# Patient Record
Sex: MALE
[De-identification: names, ages, dates, MRNs, and addresses within clinical notes are randomized per-mention and may not be internally consistent; named-entity substitution may affect disease eponyms.]

---

## 2017-10-05 ENCOUNTER — HOSPITAL ENCOUNTER (INPATIENT)
Age: 42
LOS: 3 days | Discharge: HOME | DRG: 638 | End: 2017-10-08

## 2017-10-05 DIAGNOSIS — E86.0: ICD-10-CM

## 2017-10-05 DIAGNOSIS — N17.9: ICD-10-CM

## 2017-10-05 DIAGNOSIS — E78.2: ICD-10-CM

## 2017-10-05 DIAGNOSIS — E11.10: Primary | ICD-10-CM

## 2019-02-18 ENCOUNTER — HOSPITAL ENCOUNTER (INPATIENT)
Dept: HOSPITAL 91 - ICU | Age: 44
LOS: 2 days | Discharge: HOME | DRG: 639 | End: 2019-02-20
Payer: MEDICAID

## 2019-02-18 ENCOUNTER — HOSPITAL ENCOUNTER (INPATIENT)
Dept: HOSPITAL 10 - E/R | Age: 44
LOS: 2 days | Discharge: HOME | DRG: 639 | End: 2019-02-20
Attending: INTERNAL MEDICINE | Admitting: INTERNAL MEDICINE
Payer: MEDICAID

## 2019-02-18 VITALS
HEIGHT: 65 IN | WEIGHT: 161.38 LBS | BODY MASS INDEX: 26.89 KG/M2 | HEIGHT: 65 IN | WEIGHT: 161.38 LBS | BODY MASS INDEX: 26.89 KG/M2

## 2019-02-18 DIAGNOSIS — K29.70: ICD-10-CM

## 2019-02-18 DIAGNOSIS — E86.0: ICD-10-CM

## 2019-02-18 DIAGNOSIS — Z91.14: ICD-10-CM

## 2019-02-18 DIAGNOSIS — E11.10: Primary | ICD-10-CM

## 2019-02-18 DIAGNOSIS — Z79.82: ICD-10-CM

## 2019-02-18 DIAGNOSIS — Z79.4: ICD-10-CM

## 2019-02-18 LAB
ADD MAN DIFF?: NO
ANION GAP: 18 (ref 5–13)
ANION GAP: 20 (ref 5–13)
ANION GAP: 28 (ref 5–13)
BASOPHIL #: 0 10^3/UL (ref 0–0.1)
BASOPHILS %: 0.4 % (ref 0–2)
BLOOD UREA NITROGEN: 12 MG/DL (ref 7–20)
BLOOD UREA NITROGEN: 14 MG/DL (ref 7–20)
BLOOD UREA NITROGEN: 15 MG/DL (ref 7–20)
CALCIUM: 7.9 MG/DL (ref 8.4–10.2)
CALCIUM: 8.1 MG/DL (ref 8.4–10.2)
CALCIUM: 9.2 MG/DL (ref 8.4–10.2)
CARBON DIOXIDE: 7 MMOL/L (ref 21–31)
CARBON DIOXIDE: 8 MMOL/L (ref 21–31)
CARBON DIOXIDE: 9 MMOL/L (ref 21–31)
CHLORIDE: 100 MMOL/L (ref 97–110)
CHLORIDE: 108 MMOL/L (ref 97–110)
CHLORIDE: 109 MMOL/L (ref 97–110)
CREATININE: 0.63 MG/DL (ref 0.61–1.24)
CREATININE: 0.67 MG/DL (ref 0.61–1.24)
CREATININE: 0.94 MG/DL (ref 0.61–1.24)
EOSINOPHILS #: 0 10^3/UL (ref 0–0.5)
EOSINOPHILS %: 0.2 % (ref 0–7)
FIO2: 21 %
GLUCOSE: 217 MG/DL (ref 70–220)
GLUCOSE: 280 MG/DL (ref 70–220)
GLUCOSE: 335 MG/DL (ref 70–220)
HEMATOCRIT: 46.2 % (ref 42–52)
HEMOGLOBIN A1C: (no result) % (ref 0–5.9)
HEMOGLOBIN: 15.9 G/DL (ref 14–18)
IMMATURE GRANS #M: 0.05 10^3/UL (ref 0–0.03)
IMMATURE GRANS % (M): 0.5 % (ref 0–0.43)
LYMPHOCYTES #: 2.5 10^3/UL (ref 0.8–2.9)
LYMPHOCYTES %: 23 % (ref 15–51)
MAGNESIUM: 1.7 MG/DL (ref 1.7–2.5)
MAGNESIUM: 1.8 MG/DL (ref 1.7–2.5)
MAGNESIUM: 2 MG/DL (ref 1.7–2.5)
MEAN CORPUSCULAR HEMOGLOBIN: 29.8 PG (ref 29–33)
MEAN CORPUSCULAR HGB CONC: 34.4 G/DL (ref 32–37)
MEAN CORPUSCULAR VOLUME: 86.5 FL (ref 82–101)
MEAN PLATELET VOLUME: 10.7 FL (ref 7.4–10.4)
METHGB VENOUS: 0.2 %
METHGB VENOUS: 0.3 %
METHGB VENOUS: 0.3 %
MODE: (no result)
MONOCYTE #: 0.8 10^3/UL (ref 0.3–0.9)
MONOCYTES %: 7 % (ref 0–11)
NEUTROPHIL #: 7.6 10^3/UL (ref 1.6–7.5)
NEUTROPHILS %: 68.9 % (ref 39–77)
NUCLEATED RED BLOOD CELLS #: 0 10^3/UL (ref 0–0)
NUCLEATED RED BLOOD CELLS%: 0 /100WBC (ref 0–0)
PHOSPHORUS: 2.3 MG/DL (ref 2.5–4.9)
PHOSPHORUS: 3.3 MG/DL (ref 2.5–4.9)
PHOSPHORUS: 4 MG/DL (ref 2.5–4.9)
PLATELET COUNT: 219 10^3/UL (ref 140–415)
POTASSIUM: 3.6 MMOL/L (ref 3.5–5.1)
POTASSIUM: 3.8 MMOL/L (ref 3.5–5.1)
POTASSIUM: 4.4 MMOL/L (ref 3.5–5.1)
RED BLOOD COUNT: 5.34 10^6/UL (ref 4.7–6.1)
RED CELL DISTRIBUTION WIDTH: 12.9 % (ref 11.5–14.5)
SAMPLE TYPE: (no result)
SODIUM: 135 MMOL/L (ref 135–144)
SODIUM: 135 MMOL/L (ref 135–144)
SODIUM: 137 MMOL/L (ref 135–144)
TROPONIN-I: < 0.012 NG/ML (ref 0–0.12)
VENOUS COHB: 0.1 %
VENOUS COHB: 0.2 %
VENOUS COHB: 0.5 %
VENOUS FRACTION OXYHGB: 54.1 %
VENOUS FRACTION OXYHGB: 75.6 %
VENOUS FRACTION OXYHGB: 78.4 %
VENOUS OXYGEN SAT: 54.5 MMHG (ref 55–75)
VENOUS OXYGEN SAT: 75.9 MMHG (ref 55–75)
VENOUS OXYGEN SAT: 78.7 MMHG (ref 55–75)
VENOUS TOTAL HEMGLOBIN: 13.9 G/DL
VENOUS TOTAL HEMGLOBIN: 14.3 G/DL
VENOUS TOTAL HEMGLOBIN: 16.1 G/DL
WHITE BLOOD COUNT: 11 10^3/UL (ref 4.8–10.8)

## 2019-02-18 PROCEDURE — 96374 THER/PROPH/DIAG INJ IV PUSH: CPT

## 2019-02-18 PROCEDURE — 82803 BLOOD GASES ANY COMBINATION: CPT

## 2019-02-18 PROCEDURE — 84484 ASSAY OF TROPONIN QUANT: CPT

## 2019-02-18 PROCEDURE — 82962 GLUCOSE BLOOD TEST: CPT

## 2019-02-18 PROCEDURE — 85025 COMPLETE CBC W/AUTO DIFF WBC: CPT

## 2019-02-18 PROCEDURE — 83735 ASSAY OF MAGNESIUM: CPT

## 2019-02-18 PROCEDURE — 81001 URINALYSIS AUTO W/SCOPE: CPT

## 2019-02-18 PROCEDURE — 93005 ELECTROCARDIOGRAM TRACING: CPT

## 2019-02-18 PROCEDURE — 36415 COLL VENOUS BLD VENIPUNCTURE: CPT

## 2019-02-18 PROCEDURE — 87081 CULTURE SCREEN ONLY: CPT

## 2019-02-18 PROCEDURE — 84100 ASSAY OF PHOSPHORUS: CPT

## 2019-02-18 PROCEDURE — 99291 CRITICAL CARE FIRST HOUR: CPT

## 2019-02-18 PROCEDURE — 80048 BASIC METABOLIC PNL TOTAL CA: CPT

## 2019-02-18 PROCEDURE — C9113 INJ PANTOPRAZOLE SODIUM, VIA: HCPCS

## 2019-02-18 RX ADMIN — VASOPRESSIN 1 MLS/HR: 20 INJECTION, SOLUTION INTRAMUSCULAR; SUBCUTANEOUS at 20:52

## 2019-02-18 RX ADMIN — FAMOTIDINE 1 MG: 10 INJECTION, SOLUTION INTRAVENOUS at 19:22

## 2019-02-18 RX ADMIN — PYRIDOXINE HYDROCHLORIDE 1 MLS/HR: 100 INJECTION, SOLUTION INTRAMUSCULAR; INTRAVENOUS at 20:17

## 2019-02-18 RX ADMIN — ALUMINUM HYDROXIDE, MAGNESIUM HYDROXIDE, DIMETHICONE 1 ML: 200; 200; 20 SUSPENSION ORAL at 19:22

## 2019-02-18 RX ADMIN — THIAMINE HYDROCHLORIDE 1 MLS/HR: 100 INJECTION, SOLUTION INTRAMUSCULAR; INTRAVENOUS at 19:22

## 2019-02-18 NOTE — ERD
ER Documentation


Chief Complaint


Chief Complaint





Complains of dizziness x 3 days





HPI


44-year-old male who presents to the emergency room asking for refill of his 


metformin 1000 twice daily.  He states that he has been without it for several 


days.  He is feeling occasional lightheadedness and dizziness.  He is describing


epigastric burning abdominal discomfort radiating up into his chest that is 


postprandial.  He denies exertional chest pain, no pleuritic pain.  No polyuria 


polydipsia polyphagia.  He is also been off of insulin for greater than 1 year 


and does not have a primary care physician.





During the patient's encounter translation services were utilized


Language: Libyan


Source: In person





ROS


All systems reviewed and are negative except as per history of present illness.





Medications


Home Meds


Reported Medications


Omega-3 Fatty Acids/Fish Oil (Fish Oil 1,000 mg Capsule) 1 Each Capsule, 1 EACH 


PO DAILY, CAP


   2/18/19


Discontinued Scripts


Blood Glucose Strips-Dispmeter ([facility] Blood Glucose System) 1 Each Kit, 1 


EACH MC DAILY, #1


   Prov:KARINA IBRAHIM MD         10/8/17


Blood-Glucose Meter (BLOOD GLUCOSE MONITORING) 1 Each Each, 1 EACH MC DAILY, #1


   Prov:KARINA IBRAHIM MD         10/8/17


Atorvastatin* (Atorvastatin*) 40 Mg Tablet, 40 MG PO HS for 60 Days, #60 TAB 5 


Refills


   Prov:KARINA IBRAHIM MD         10/8/17


Aspirin (Aspirin) 81 Mg Chew, 81 MG PO DAILY for 30 Days, #30 TAB 5 Refills


   Prov:KARINA IBRAHIM MD         10/8/17


Linagliptin (TRADJENTA) 5 Mg Tablet, 5 MG PO DAILY for 30 Days, #30 TAB 5 


Refills


   Prov:KARINA IBRAHIM MD         10/8/17


Metformin Hcl (Glucophage) 500 Mg Tablet, 1000 MG PO PC BREAKFAST DINNER for 30 


Days, #60 TAB 4 Refills


   Prov:KARINA IBRAHIM MD         10/8/17


Insulin Glargine* (Lantus*) 100 Unit/Ml Soln, 25 UNIT SC DAILY@08 for 30 Days, 


#30 DAYSX 3 LO 5 Refills


   Prov:KARINA IBRAHIM MD         10/8/17


Insulin Aspart* (Novolog Insulin Pen*) 100 Unit/Ml Soln, 12 UNIT SC WITH MEALS 


for 30 Days, #30 DAYSX 3 LO 5 Refills


   Prov:KARINA IBRAHIM MD         10/8/17





Allergies


Allergies:  


Coded Allergies:  


     No Known Allergy (Unverified , 2/18/19)





PMhx/Soc


History of Surgery:  No


Hx Miscellaneous Medical Probl:  Yes (DM)


Hx Alcohol Use:  Yes


Hx Substance Use:  No


Hx Tobacco Use:  Yes





FmHx


Family History:  No diabetes





Physical Exam


Vitals





Vital Signs


  Date      Temp  Pulse  Resp  B/P (MAP)   Pulse Ox  O2          O2 Flow    FiO2


Time                                                 Delivery    Rate


   2/18/19           88    20      115/76        99  Room Air


     20:15                           (89)


   2/18/19           98    26      124/79       100  Room Air


     19:00                           (94)


   2/18/19  97.9    101    20      122/68       100


     17:02                           (86)





Physical Exam


General: Well developed, well nourished, no acute distress


Head: Normocephalic, atraumatic.


Eyes: Pupils equally reactive, EOM intact


ENT: Moist mucous membranes


Neck: Supple, no lymphadenopathy


Respiratory: Lungs clear bilaterally, no distress


Cardiovascular: RRR, no murmurs, rubs, or gallops


Abdominal: Soft, non-tender, non-distended, no peritoneal signs


: Deferred


MSK: No edema, no unilateral swelling, 5/5 strength


Neurologic: Alert and oriented, moving all extremities, normal speech, no focal 


weakness, no cerebellar signs


Skin: No rash


Psych: Normal mood


Result Diagram:  


2/18/19 1905 2/18/19 1905





Results 24 hrs





Laboratory Tests


   Test
                                 2/18/19
19:05         2/18/19
19:06


   White Blood Count                     11.0 10^3/ul


   Red Blood Count                       5.34 10^6/ul


   Hemoglobin                               15.9 g/dl


   Hematocrit                                  46.2 %


   Mean Corpuscular Volume                    86.5 fl


   Mean Corpuscular Hemoglobin                29.8 pg


   Mean Corpuscular Hemoglobin
Concent     34.4 g/dl 
  



   Red Cell Distribution Width                 12.9 %


   Platelet Count                         219 10^3/UL


   Mean Platelet Volume                       10.7 fl


   Immature Granulocytes %                    0.500 %


   Neutrophils %                               68.9 %


   Lymphocytes %                               23.0 %


   Monocytes %                                  7.0 %


   Eosinophils %                                0.2 %


   Basophils %                                  0.4 %


   Nucleated Red Blood Cells %            0.0 /100WBC


   Immature Granulocytes #              0.050 10^3/ul


   Neutrophils #                          7.6 10^3/ul


   Lymphocytes #                          2.5 10^3/ul


   Monocytes #                            0.8 10^3/ul


   Eosinophils #                          0.0 10^3/ul


   Basophils #                            0.0 10^3/ul


   Nucleated Red Blood Cells #            0.0 10^3/ul


   Sodium Level                            135 mmol/L


   Potassium Level                         4.4 mmol/L


   Chloride Level                          100 mmol/L


   Carbon Dioxide Level                      7 mmol/L


   Anion Gap                                       28


   Blood Urea Nitrogen                       15 mg/dl


   Creatinine                              0.94 mg/dl


   Est Glomerular Filtrat Rate
mL/min   > 60 mL/min 
   



   Glucose Level                            335 mg/dl


   Bedside Glucose                          361 mg/dL


   Hemoglobin A1c                        %


   Calcium Level                            9.2 mg/dl


   Phosphorus Level                         4.0 mg/dl


   Magnesium Level                          2.0 mg/dl


   Troponin I                           < 0.012 ng/ml


   Blood Gas Specimen Source                            Blood venous


   Arterial Blood Date Drawn
           
               2/18/2019
7:10:14 PM


   Arterial Blood Gas Puncture
Site     
               VENOUS LINE 



   Salvador Test                                           N/A


   Venous Blood pH                                                    7.151


   Venous Blood pCO2 (Temp
Corrected)   
                        28.2 mmHG 



   Venous Blood pO2 (Temp
Corrected)    
                        28.6 mmHG 



   Venous Blood HCO3                                             9.6 mmol/L


   Venous Blood Oxygen Saturation                                 54.5 mmHG


   Venous Blood Base Excess                                    -17.7 mmol/L


   Venous Blood Total Hemoglobin                                  16.1 g/dl


   Venous Blood Oxyhemoglobin                                        54.1 %


   Venous Blood Methemoglobin                                         0.3 %


   Carboxyhemoglobin                                                  0.5 %


   Blood Gas Temperature                                             37.0 C


   Blood Gas Modality                                   ROOM AIR


   FiO2                                                              21.0 %


   Blood Gas Critical Value Read
Back   
               JEWEL BURNHAM
RN


   Blood Gas Notified Whom                              MM


   Blood Gas Notified Time
             
               2/18/2019
7:17:29 PM





Current Medications


 Medications
   Dose
          Sig/Linnette
       Start Time
   Status  Last


 (Trade)       Ordered        Route
 PRN     Stop Time              Admin
Dose


                              Reason                                Admin


 Sodium         740 ml @ 
     ONCE  ONCE
    2/18/19       DC           2/18/19


Chloride       740 mls/hr     IV
            19:00
                       19:22



                                             2/18/19 19:59


 Famotidine
    20 mg          ONCE  STAT
    2/18/19       DC           2/18/19


(Pepcid Iv)                   IV
            18:57
                       19:22



                                             2/18/19 18:58


                40 ml          ONCE  STAT
    2/18/19       DC           2/18/19


Miscellaneous                 PO
            18:57
                       19:22




 Medication
                                2/18/19 18:58


 (Gi Cocktail


(2))


 Potassium
     1,000 ml @ 
   Q0M
 IV
       2/18/19                



Chloride/Sodi  0 mls/hr                      19:19



um
 Chloride


 Potassium
     1,000 ml @ 
   Q0M
 IV
       2/18/19                



Chloride/Dext  0 mls/hr                      19:19



jose/
 Sod Cl


 Potassium
     1,000 ml @ 
   Q0M
 IV
       2/18/19                



Chloride/Sodi  0 mls/hr                      19:19



um
 Chloride


 Potassium
     1,000 ml @ 
   Q0M
 IV
       2/18/19                



Chloride/Dext  0 mls/hr                      19:19



jose/
 Sod Cl


 Sodium         1,000 ml @ 
   Q0M
 IV
       2/18/19                



Chloride       0 mls/hr                      19:19



                1,000 ml @ 
   Q0M
 IV
       2/18/19                



Dextrose/Sodi  0 mls/hr                      19:19



um
 Chloride


 Insulin        101 ml @ 
     ER DKA         2/18/19                



Human
         7.45 mls/hr    PROTOCOL
 IV
  19:30



Regular 100


unit/
 Sodium


Chloride


 Lactated       740 ml @ 
     ONCE  ONCE
    2/18/19       DC           2/18/19


Ringer's       740 mls/hr     IV
            19:30
                       20:17



                                             2/18/19 20:29


                               HYPOGLYCEM     2/18/19                



Miscellaneous  HYPOGLYCEMIA
  PROTOCOL PRN
  19:30




              TREATMENT      XX



Information
                  .HYPOGLYCEMIA


(*                            PROTOCOL


Miscellaneous



 Pharmacy


Order)


 Dextrose
      50 ml          Q15M  PRN
     2/18/19                



(D50w                         IV
            19:30



Syringe)                      .DECREASED


                              GLUCOSE


 Dextrose
      25 ml          Q15M  PRN
     2/18/19                



(D50w                         IV
            19:30



Syringe)                      .DECREASED


                              GLUCOSE








Procedures/MDM


EKG, MONITORS, & DIAGNOSTIC IMAGING:


EKG: I reviewed and interpreted a 12-lead EKG. 


Rhythm: Normal sinus rhythm


ST Changes: No contiguous ST segment elevations


T waves: No contiguous T wave inversions


Impression: [No evidence of acute cardiac ischemia]





LAB INTERPRETATION:


I reviewed the laboratory testing and it shows evidence of diabetic ketoacidosis


with a pH of 7.1, bicarb of 7 and anion gap acidosis





MEDICAL DECISION MAKING:


The patient presents after running out of his metformin.  He has signs and 


symptoms consistent with reflux.  His chest pain is not consistent with acute 


coronary syndrome though given the patient's age and history of diabetes I do 


believe EKG and troponin would be reasonable.  GI cocktail appropriate.  Benign 


abdominal examination.  Patient will need screening to rule out diabetic 


ketoacidosis and refill his metformin.  I do not feel the patient requires 


refill of his insulin given he has been off of the medication for greater than 1


year.  He needs to follow-up with primary care physician and possibly 


endocrinologist.





ER COURSE:


* Patient's Accu-Chek revealed hyperglycemia, venous blood gas revealed evidence


  and concern for diabetic ketoacidosis with a pH of 7.1


* The patient was initiated on DKA protocol with a bolus of normal saline and 


  lactated Ringer's.  Supplemental replacement fluid and potassium was ordered 


  per protocol.  Patient will be started on insulin drip.  Intensive care unit 


  needed.


* The patient is protecting his airway and does not require intubation or 


  positive pressure ventilation.  Continue to monitor and hydrate.





CONSULTATION:


[None]





DISPOSITION PLAN:


Accepting care team and consultations:


I discussed the current laboratory data, diagnostic imaging and emergency care 


provided.


Admitting team: Dr. Fatima


Admitting team indication: Insurance directed








Critical Care Note:


Total time: 40 min


Indication/Organ System Threat: Diabetic ketoacidosis


I spent the above amount of critical care time with the patient, not including 


billable procedures.  This included chart review, consultations, repeat bedside 


evaluations, and titration of appropriate medications to prevent cardiopulmonary


 or respiratory collapse.





Departure


Diagnosis:  


   Primary Impression:  


   Diabetic ketoacidosis


   Diabetes mellitus type:  type 2  Diabetes mellitus complication detail:  


   without coma  Qualified Codes:  E11.10 - Type 2 diabetes mellitus with 


   ketoacidosis without coma


   Additional Impressions:  


   Noncompliance with medication regimen


   Dehydration


Condition:  Critical











URSULA DICKINSON MD          Feb 18, 2019 19:01

## 2019-02-18 NOTE — HP
Date/Time of Note


Date/Time of Note


DATE: 2/18/19 


TIME: 20:48





Assessment/Plan


VTE Prophylaxis


SCD applied (from Nsg):  Yes


Pharmacological prophylaxis:  LMWH





Lines/Catheters


IV Catheter Type (from Nrsg):  Saline Lock





Assessment/Plan


Assessment/Plan


1.  Diabetic ketoacidosis


- patient has been without metformin for 2 weeks


- Will start on DKA protocol and monitor in ICU


- once gap closes, will transition to SC





2. Diabetes Mellitus


- Will check A1c


- hold home PO medications


- Diabetic educator consultation placed as well as Dietary consult for education





3.  Gastritis


- continue on PPI





4.  Diet


- NPO





5.  Gi ppx


- PPI





6.  DVT ppx


- LMWH





7.  Disposition


- Admit to ICU for DKA protocol


Result Diagram:  


2/18/19 1905 2/18/19 1905





Results 24hrs





Laboratory Tests


    Test
                                2/18/19
19:05         2/18/19
19:06


    White Blood Count                         11.0  #H


    Red Blood Count                             5.34


    Hemoglobin                                  15.9


    Hematocrit                                  46.2


    Mean Corpuscular Volume                     86.5


    Mean Corpuscular Hemoglobin                 29.8


    Mean Corpuscular Hemoglobin
Concent        34.4  
  



    Red Cell Distribution Width                 12.9


    Platelet Count                               219


    Mean Platelet Volume                       10.7  H


    Immature Granulocytes %                   0.500  H


    Neutrophils %                               68.9


    Lymphocytes %                               23.0


    Monocytes %                                  7.0


    Eosinophils %                                0.2


    Basophils %                                  0.4


    Nucleated Red Blood Cells %                  0.0


    Immature Granulocytes #                   0.050  H


    Neutrophils #                               7.6  H


    Lymphocytes #                                2.5


    Monocytes #                                  0.8


    Eosinophils #                                0.0


    Basophils #                                  0.0


    Nucleated Red Blood Cells #                  0.0


    Sodium Level                                 135


    Potassium Level                              4.4


    Chloride Level                               100


    Carbon Dioxide Level                         7  *L


    Anion Gap                                    28  H


    Blood Urea Nitrogen                           15


    Creatinine                                  0.94


    Est Glomerular Filtrat Rate
mL/min   > 60  
        



    Glucose Level                               335  H


    Bedside Glucose                             361  H


    Hemoglobin A1c


    Calcium Level                                9.2


    Phosphorus Level                             4.0


    Magnesium Level                              2.0


    Troponin I                           < 0.012


    Blood Gas Specimen Source                           Blood venous


    Arterial Blood Date Drawn
           
              2/18/2019
7:10:14 PM


    Arterial Blood Gas Puncture
Site     
              VENOUS LINE  



    Salvador Test                                          N/A


    Venous Blood pH                                                7.151  *L


    Venous Blood pCO2 (Temp
Corrected)   
                          28.2  L



    Venous Blood pO2 (Temp
Corrected)    
                           28.6  



    Venous Blood HCO3                                                 9.6  L


    Venous Blood Oxygen Saturation                                   54.5  L


    Venous Blood Base Excess                                        -17.7  L


    Venous Blood Total Hemoglobin                                     16.1


    Venous Blood Oxyhemoglobin                                        54.1


    Venous Blood Methemoglobin                                         0.3


    Carboxyhemoglobin                                                  0.5


    Blood Gas Temperature                                             37.0


    Blood Gas Modality                                  ROOM AIR


    FiO2                                                              21.0


    Blood Gas Critical Value Read
Back   
              JEWEL BURNHAM RN


    Blood Gas Notified Whom                             MM


    Blood Gas Notified Time
             
              2/18/2019
7:17:29 PM








HPI/ROS


Admit Date/Time


Admit Date/Time


2/18/19 2030





Hx of Present Illness


43 yo M with PMH diabetes presented to ED with dizziness for the past 3 days.  


Patient states he has not been taking his metformin for the past 2 weeks since 


he ran out of his medications.  He has been experiencing weakness, dizziness, 


and acid reflux for the past 3 days.  He has been drinking fluids but has not 


been taking much PO intake given reflux symptoms which worsen with food.  


Patient has associated nausea, but denies any vomiting, hematemesis, chest pain,


shortness of breath, palpitations, abdominal pain, urinary issues, diarrhea, 


melena, or bright red blood per rectum.  In ED, patient found in DKA and started


on protocol.





ROS


All 12 systems reviewed and pertinent positives as per HPI. All others negative.


Constitutional:  chills, fatigue, nausea


Eyes:  No discharge


ENT:  No congestion


Respiratory:  No cough, No shortness of breath, No sputum, No wheezing


Cardiovascular:  lightheadedness; 


   No chest pain, No palpitations


Gastrointestinal:  pain, constipation, nausea, passing stool; 


   No diarrhea, No vomiting


Genitourinary:  no complaints


Musculoskeletal:  no complaints


Skin:  No erythema, No laceration


Neurologic:  dizziness; 


   No confusion, No focal-weakness, No syncope


Endocrine:  no complaints


Lymphatic:  no complaints


Psychological:  nl mood/affect


Immunologic:  no complaints





PMH/Family/Social


Past Medical History


Medical History:  diabetes


Medications





Current Medications


Potassium Chloride/Sodium Chloride 1,000 ml @  0 mls/hr Q0M IV ;  Start 2/18/19 


at 19:19


Potassium Chloride/Dextrose/ Sod Cl 1,000 ml @  0 mls/hr Q0M IV ;  Start 2/18/19


at 19:19


Potassium Chloride/Sodium Chloride 1,000 ml @  0 mls/hr Q0M IV ;  Start 2/18/19 


at 19:19


Potassium Chloride/Dextrose/ Sod Cl 1,000 ml @  0 mls/hr Q0M IV ;  Start 2/18/19


at 19:19


Sodium Chloride 1,000 ml @  0 mls/hr Q0M IV ;  Start 2/18/19 at 19:19


Dextrose/Sodium Chloride 1,000 ml @  0 mls/hr Q0M IV ;  Start 2/18/19 at 19:19


Insulin Human Regular 100 unit/ Sodium Chloride 101 ml @  7.45 mls/hr ER DKA 


PROTOCOL IV ;  Start 2/18/19 at 19:30


Miscellaneous Information (* Miscellaneous Pharmacy Order) HYPOGLYCEMIA 


TREATMENT HYPOGLYCEM PROTOCOL PRN XX .HYPOGLYCEMIA PROTOCOL;  Start 2/18/19 at 


19:30


Dextrose (D50w Syringe) 50 ml Q15M  PRN IV .DECREASED GLUCOSE;  Start 2/18/19 at


19:30


Dextrose (D50w Syringe) 25 ml Q15M  PRN IV .DECREASED GLUCOSE;  Start 2/18/19 at


19:30


Coded Allergies:  


     No Known Allergy (Unverified , 2/18/19)





Past Surgical History


Past Surgical Hx:  no surgical history





Family History


Significant Family History:  cancer, diabetes





Social History


Alcohol Use:  rarely


Smoking Status:  Never smoker


Drug Use:  none





Exam/Review of Systems


Vital Signs


Vitals





Vital Signs


  Date      Temp  Pulse  Resp  B/P (MAP)   Pulse Ox  O2          O2 Flow    FiO2


Time                                                 Delivery    Rate


   2/18/19           88    20      115/76        99  Room Air


     20:15                           (89)


   2/18/19  97.9


     17:02








Exam


Exam


General: Patient is a pleasant male, fatigued, currently lying in bed in no 


acute distress, dry mucous membranes


HEENT: Atraumatic, normocephalic. The pupils are equal, round and reactive. 


Extraocular motor are intact


Neck: Supple with full range of motion. No rigidity or meningismus


Chest: Nontender


Lungs: Clear to auscultation bilaterally no crackles rales or wheezing


Heart: Normal S1-S2, Regular rhythm and rate. no murmurs


Abdomen: Soft , nontender,  nondistended , bowel sounds are present. No guarding


no rebound tenderness. No masses or organomegaly. No costovertebral temporal 


angle mass


Extremities: Normal to inspection, no edema no cyanosis


Neurologic: Normal mental status, speech normal, cranial nerves II through XII 


are intact, motor and sensory are intact,


Additional Comments


Home medications reviewed











CISCO FERMIN MD                 Feb 18, 2019 20:48

## 2019-02-19 VITALS — DIASTOLIC BLOOD PRESSURE: 71 MMHG | RESPIRATION RATE: 18 BRPM | HEART RATE: 79 BPM | SYSTOLIC BLOOD PRESSURE: 106 MMHG

## 2019-02-19 VITALS — DIASTOLIC BLOOD PRESSURE: 71 MMHG | RESPIRATION RATE: 14 BRPM | SYSTOLIC BLOOD PRESSURE: 103 MMHG | HEART RATE: 79 BPM

## 2019-02-19 VITALS — DIASTOLIC BLOOD PRESSURE: 72 MMHG | RESPIRATION RATE: 21 BRPM | HEART RATE: 80 BPM | SYSTOLIC BLOOD PRESSURE: 105 MMHG

## 2019-02-19 VITALS — RESPIRATION RATE: 20 BRPM | DIASTOLIC BLOOD PRESSURE: 69 MMHG | HEART RATE: 82 BPM | SYSTOLIC BLOOD PRESSURE: 106 MMHG

## 2019-02-19 VITALS — SYSTOLIC BLOOD PRESSURE: 98 MMHG | DIASTOLIC BLOOD PRESSURE: 67 MMHG | RESPIRATION RATE: 18 BRPM | HEART RATE: 84 BPM

## 2019-02-19 VITALS — RESPIRATION RATE: 13 BRPM | SYSTOLIC BLOOD PRESSURE: 93 MMHG | HEART RATE: 82 BPM | DIASTOLIC BLOOD PRESSURE: 64 MMHG

## 2019-02-19 VITALS — SYSTOLIC BLOOD PRESSURE: 108 MMHG | DIASTOLIC BLOOD PRESSURE: 66 MMHG | RESPIRATION RATE: 22 BRPM | HEART RATE: 79 BPM

## 2019-02-19 VITALS — HEART RATE: 91 BPM | DIASTOLIC BLOOD PRESSURE: 75 MMHG | RESPIRATION RATE: 19 BRPM | SYSTOLIC BLOOD PRESSURE: 108 MMHG

## 2019-02-19 VITALS — SYSTOLIC BLOOD PRESSURE: 99 MMHG | HEART RATE: 75 BPM | DIASTOLIC BLOOD PRESSURE: 63 MMHG | RESPIRATION RATE: 16 BRPM

## 2019-02-19 VITALS — HEART RATE: 86 BPM | DIASTOLIC BLOOD PRESSURE: 66 MMHG | RESPIRATION RATE: 20 BRPM | SYSTOLIC BLOOD PRESSURE: 101 MMHG

## 2019-02-19 VITALS — RESPIRATION RATE: 18 BRPM | DIASTOLIC BLOOD PRESSURE: 73 MMHG | SYSTOLIC BLOOD PRESSURE: 105 MMHG | HEART RATE: 89 BPM

## 2019-02-19 VITALS — RESPIRATION RATE: 25 BRPM | SYSTOLIC BLOOD PRESSURE: 108 MMHG | HEART RATE: 87 BPM | DIASTOLIC BLOOD PRESSURE: 73 MMHG

## 2019-02-19 VITALS — SYSTOLIC BLOOD PRESSURE: 94 MMHG | HEART RATE: 75 BPM | DIASTOLIC BLOOD PRESSURE: 64 MMHG | RESPIRATION RATE: 18 BRPM

## 2019-02-19 VITALS — SYSTOLIC BLOOD PRESSURE: 105 MMHG | DIASTOLIC BLOOD PRESSURE: 71 MMHG | RESPIRATION RATE: 22 BRPM | HEART RATE: 76 BPM

## 2019-02-19 VITALS — SYSTOLIC BLOOD PRESSURE: 93 MMHG | HEART RATE: 76 BPM | RESPIRATION RATE: 16 BRPM | DIASTOLIC BLOOD PRESSURE: 63 MMHG

## 2019-02-19 VITALS — DIASTOLIC BLOOD PRESSURE: 60 MMHG | RESPIRATION RATE: 18 BRPM | HEART RATE: 77 BPM | SYSTOLIC BLOOD PRESSURE: 95 MMHG

## 2019-02-19 VITALS — RESPIRATION RATE: 21 BRPM | HEART RATE: 92 BPM | DIASTOLIC BLOOD PRESSURE: 74 MMHG | SYSTOLIC BLOOD PRESSURE: 105 MMHG

## 2019-02-19 VITALS — DIASTOLIC BLOOD PRESSURE: 63 MMHG | RESPIRATION RATE: 13 BRPM | HEART RATE: 86 BPM | SYSTOLIC BLOOD PRESSURE: 107 MMHG

## 2019-02-19 VITALS — HEART RATE: 79 BPM | DIASTOLIC BLOOD PRESSURE: 74 MMHG | SYSTOLIC BLOOD PRESSURE: 112 MMHG | RESPIRATION RATE: 18 BRPM

## 2019-02-19 VITALS — HEART RATE: 83 BPM | RESPIRATION RATE: 24 BRPM | SYSTOLIC BLOOD PRESSURE: 104 MMHG | DIASTOLIC BLOOD PRESSURE: 68 MMHG

## 2019-02-19 VITALS — RESPIRATION RATE: 25 BRPM | SYSTOLIC BLOOD PRESSURE: 109 MMHG | DIASTOLIC BLOOD PRESSURE: 85 MMHG | HEART RATE: 100 BPM

## 2019-02-19 VITALS — HEART RATE: 86 BPM

## 2019-02-19 LAB
ADD UMIC: YES
ANION GAP: 10 (ref 5–13)
ANION GAP: 11 (ref 5–13)
ANION GAP: 12 (ref 5–13)
ANION GAP: 13 (ref 5–13)
ANION GAP: 9 (ref 5–13)
ANION GAP: 9 (ref 5–13)
BLOOD UREA NITROGEN: 10 MG/DL (ref 7–20)
BLOOD UREA NITROGEN: 11 MG/DL (ref 7–20)
BLOOD UREA NITROGEN: 13 MG/DL (ref 7–20)
BLOOD UREA NITROGEN: 15 MG/DL (ref 7–20)
BLOOD UREA NITROGEN: 16 MG/DL (ref 7–20)
BLOOD UREA NITROGEN: 9 MG/DL (ref 7–20)
CALCIUM: 7.9 MG/DL (ref 8.4–10.2)
CALCIUM: 8 MG/DL (ref 8.4–10.2)
CALCIUM: 8.4 MG/DL (ref 8.4–10.2)
CALCIUM: 8.4 MG/DL (ref 8.4–10.2)
CALCIUM: 8.8 MG/DL (ref 8.4–10.2)
CALCIUM: 8.9 MG/DL (ref 8.4–10.2)
CARBON DIOXIDE: 16 MMOL/L (ref 21–31)
CARBON DIOXIDE: 17 MMOL/L (ref 21–31)
CARBON DIOXIDE: 17 MMOL/L (ref 21–31)
CARBON DIOXIDE: 18 MMOL/L (ref 21–31)
CARBON DIOXIDE: 20 MMOL/L (ref 21–31)
CARBON DIOXIDE: 22 MMOL/L (ref 21–31)
CHLORIDE: 100 MMOL/L (ref 97–110)
CHLORIDE: 103 MMOL/L (ref 97–110)
CHLORIDE: 106 MMOL/L (ref 97–110)
CHLORIDE: 107 MMOL/L (ref 97–110)
CHLORIDE: 107 MMOL/L (ref 97–110)
CHLORIDE: 113 MMOL/L (ref 97–110)
CREATININE: 0.59 MG/DL (ref 0.61–1.24)
CREATININE: 0.62 MG/DL (ref 0.61–1.24)
CREATININE: 0.67 MG/DL (ref 0.61–1.24)
CREATININE: 0.74 MG/DL (ref 0.61–1.24)
CREATININE: 0.8 MG/DL (ref 0.61–1.24)
CREATININE: 0.81 MG/DL (ref 0.61–1.24)
FIO2: 21 %
GLUCOSE: 105 MG/DL (ref 70–220)
GLUCOSE: 167 MG/DL (ref 70–220)
GLUCOSE: 259 MG/DL (ref 70–220)
GLUCOSE: 304 MG/DL (ref 70–220)
GLUCOSE: 369 MG/DL (ref 70–220)
GLUCOSE: 86 MG/DL (ref 70–220)
MAGNESIUM: 1.7 MG/DL (ref 1.7–2.5)
MAGNESIUM: 1.8 MG/DL (ref 1.7–2.5)
MAGNESIUM: 1.9 MG/DL (ref 1.7–2.5)
MAGNESIUM: 1.9 MG/DL (ref 1.7–2.5)
METHGB VENOUS: 0.3 %
MODE: (no result)
PHOSPHORUS: 2.1 MG/DL (ref 2.5–4.9)
PHOSPHORUS: 2.5 MG/DL (ref 2.5–4.9)
PHOSPHORUS: 3.2 MG/DL (ref 2.5–4.9)
PHOSPHORUS: 3.3 MG/DL (ref 2.5–4.9)
POTASSIUM: 2.8 MMOL/L (ref 3.5–5.1)
POTASSIUM: 3.2 MMOL/L (ref 3.5–5.1)
POTASSIUM: 3.2 MMOL/L (ref 3.5–5.1)
POTASSIUM: 3.5 MMOL/L (ref 3.5–5.1)
POTASSIUM: 3.5 MMOL/L (ref 3.5–5.1)
POTASSIUM: 3.6 MMOL/L (ref 3.5–5.1)
SAMPLE TYPE: (no result)
SODIUM: 132 MMOL/L (ref 135–144)
SODIUM: 134 MMOL/L (ref 135–144)
SODIUM: 134 MMOL/L (ref 135–144)
SODIUM: 135 MMOL/L (ref 135–144)
SODIUM: 137 MMOL/L (ref 135–144)
SODIUM: 138 MMOL/L (ref 135–144)
UR ASCORBIC ACID: NEGATIVE MG/DL
UR BILIRUBIN (DIP): NEGATIVE MG/DL
UR BLOOD (DIP): (no result) MG/DL
UR CLARITY: CLEAR
UR COLOR: (no result)
UR GLUCOSE (DIP): (no result) MG/DL
UR KETONES (DIP): (no result) MG/DL
UR LEUKOCYTE ESTERASE (DIP): NEGATIVE LEU/UL
UR MUCUS: (no result) /HPF
UR NITRITE (DIP): NEGATIVE MG/DL
UR PH (DIP): 5 (ref 5–9)
UR RBC: 2 /HPF (ref 0–5)
UR SPECIFIC GRAVITY (DIP): 1.02 (ref 1–1.03)
UR TOTAL PROTEIN (DIP): NEGATIVE MG/DL
UR UROBILINOGEN (DIP): NEGATIVE MG/DL
UR WBC: 1 /HPF (ref 0–5)
VENOUS COHB: 0.3 %
VENOUS FRACTION OXYHGB: 86.4 %
VENOUS OXYGEN SAT: 86.9 MMHG (ref 55–75)
VENOUS TOTAL HEMGLOBIN: 14 G/DL

## 2019-02-19 RX ADMIN — ENOXAPARIN SODIUM 1 MG: 100 INJECTION SUBCUTANEOUS at 09:04

## 2019-02-19 RX ADMIN — PANTOPRAZOLE SODIUM 1 MG: 40 INJECTION, POWDER, FOR SOLUTION INTRAVENOUS at 05:25

## 2019-02-19 RX ADMIN — INSULIN ASPART 1 UNIT: 100 INJECTION, SOLUTION INTRAVENOUS; SUBCUTANEOUS at 14:12

## 2019-02-19 RX ADMIN — INSULIN ASPART 1 UNIT: 100 INJECTION, SOLUTION INTRAVENOUS; SUBCUTANEOUS at 11:30

## 2019-02-19 RX ADMIN — INSULIN ASPART 1 UNIT: 100 INJECTION, SOLUTION INTRAVENOUS; SUBCUTANEOUS at 21:23

## 2019-02-19 RX ADMIN — INSULIN ASPART 1 UNIT: 100 INJECTION, SOLUTION INTRAVENOUS; SUBCUTANEOUS at 17:21

## 2019-02-19 RX ADMIN — INSULIN GLARGINE 1 UNITS: 100 INJECTION, SOLUTION SUBCUTANEOUS at 14:09

## 2019-02-19 RX ADMIN — ENOXAPARIN SODIUM SCH MG: 100 INJECTION SUBCUTANEOUS at 09:04

## 2019-02-19 RX ADMIN — INSULIN GLARGINE SCH UNITS: 100 INJECTION, SOLUTION SUBCUTANEOUS at 14:09

## 2019-02-19 RX ADMIN — PANTOPRAZOLE SODIUM SCH MG: 40 INJECTION, POWDER, FOR SOLUTION INTRAVENOUS at 05:25

## 2019-02-19 RX ADMIN — INSULIN ASPART 1 UNIT: 100 INJECTION, SOLUTION INTRAVENOUS; SUBCUTANEOUS at 17:20

## 2019-02-19 NOTE — PN
Date/Time of Note


Date/Time of Note


DATE: 2/19/19 


TIME: 13:50





Assessment/Plan


VTE Prophylaxis


Risk score (from Ns)>0 risk:  1


SCD applied (from Ns):  Yes


Pharmacological prophylaxis:  LMWH





Lines/Catheters


IV Catheter Type (from Nrs):  Peripheral IV





Assessment/Plan


Assessment/Plan


#  Diabetic ketoacidosis


#Diabetes 


- patient has been without metformin for 2 weeks


- Stopped insulin several months prior. 


- Does not appear to have any infectious event to precipitate DKA.


- Now gap closed, on subQ insulin, regular diet. 


- Holding home metformin. 





# Gastritis


- continue on PPI





# DVT ppx


- LMWH





Dispo: Transfer to med/surg, likely D/C tomorrow.


Result Diagram:  


2/18/19 1905                                                                    


           2/19/19 0749





Results 24hrs





Laboratory Tests


Test
              2/18/19
19:05    2/18/19
19:06   2/18/19
20:32  2/18/19
20:55


White Blood             11.0  #H


Count


Red Blood Count           5.34


Hemoglobin                15.9


Hematocrit                46.2


Mean                      86.5


Corpuscular


Volume


Mean                      29.8


Corpuscular


Hemoglobin


Mean                     34.4  
  
                
               



Corpuscular


Hemoglobin
Conc


ent


Red Cell                  12.9


Distribution


Width


Platelet Count             219


Mean Platelet            10.7  H


Volume


Immature                0.500  H


Granulocytes %


Neutrophils %             68.9


Lymphocytes %             23.0


Monocytes %                7.0


Eosinophils %              0.2


Basophils %                0.4


Nucleated Red              0.0


Blood Cells %


Immature                0.050  H


Granulocytes #


Neutrophils #             7.6  H


Lymphocytes #              2.5


Monocytes #                0.8


Eosinophils #              0.0


Basophils #                0.0


Nucleated Red              0.0


Blood Cells #


Sodium Level               135                                             135


Potassium Level            4.4                                             3.8


Chloride Level             100                                             109


Carbon Dioxide             7  *L                                           8  *L


Level


Anion Gap                  28  H                                          18  #H


Blood Urea                  15                                              14


Nitrogen


Creatinine                0.94                                            0.67


Est Glomerular   > 60  
          
                
               > 60  



Filtrat


Rate
mL/min


Glucose Level             335  H                                          280  H


Bedside Glucose           361  H                           276  H


Hemoglobin A1c


Calcium Level              9.2                                            8.1  L


Phosphorus                 4.0                                             3.3


Level


Magnesium Level            2.0                                             1.7


Troponin I       < 0.012


Blood Gas                         Blood venous


Specimen Source


Arterial Blood   
                2/18/2019
7:10:  
               



Date Drawn
                                 14 PM


Arterial Blood   
                VENOUS LINE  
   
               



Gas


Puncture
Site


[facility] Test                        N/A


Venous Blood pH                         7.151  *L


Venous Blood     
                       28.2  L
  
               



pCO2


(Temp
Corrected


)


Venous Blood     
                        28.6  
  
               



pO2


(Temp
Corrected


)


Venous Blood                               9.6  L


HCO3


Venous Blood                              54.5  L


Oxygen


Saturation


Venous Blood                             -17.7  L


Base Excess


Venous Blood                               16.1


Total


Hemoglobin


Venous Blood                               54.1


Oxyhemoglobin


Venous Blood                                0.3


Methemoglobin


Carboxyhemoglob                             0.5


in


Blood Gas                                  37.0


Temperature


Blood Gas                         ROOM AIR


Modality


FiO2                                       21.0


Blood Gas        
                J.               
               



Critical Value                    ZEKE BURNHAM


Read
Back


Blood Gas                         


Notified Whom


Blood Gas        
                2/18/2019
7:17:  
               



Notified Time
                              29 PM


Test
              2/18/19
21:19    2/18/19
21:46   2/18/19
22:50  2/18/19
23:05


Blood Gas        Blood venous


Specimen Source


Arterial Blood   2/18/2019
9:25:  
                
               



Date Drawn
                42 PM


Arterial Blood   VENOUS LINE  
   
                
               



Gas


Puncture
Site


[facility] Test       N/A


Venous Blood pH        7.149  *L


Venous Blood            23.4  L
  
                
               



pCO2


(Temp
Corrected


)


Venous Blood            43.3  H
  
                
               



pO2


(Temp
Corrected


)


Venous Blood              7.9  L


HCO3


Venous Blood             75.9  H


Oxygen


Saturation


Venous Blood            -19.1  L


Base Excess


Venous Blood              13.9


Total


Hemoglobin


Venous Blood              75.6


Oxyhemoglobin


Venous Blood               0.3


Methemoglobin


Carboxyhemoglob            0.1


in


Blood Gas                 37.0


Temperature


Blood Gas        ROOM AIR


Modality


FiO2                      21.0


Blood Gas        ALICIA TREVIZO RN
  
                
               



Critical Value


Read
Back


Blood Gas        


Notified Whom


Blood Gas        2/18/2019
9:31:  
                
               



Notified Time
             32 PM


Bedside Glucose                            258  H           209


Sodium Level                                                               137


Potassium Level                                                            3.6


Chloride Level                                                             108


Carbon Dioxide                                                             9  *L


Level


Anion Gap                                                                  20  H


Blood Urea                                                                  12


Nitrogen


Creatinine                                                                0.63


Est Glomerular   
                
                
               > 60  



Filtrat


Rate
mL/min


Glucose Level                                                              217


Calcium Level                                                             7.9  L


Phosphorus                                                               2.3  #L


Level


Magnesium Level                                                            1.8


Test
              2/18/19
23:19    2/18/19
23:49   2/19/19
00:30  2/19/19
01:04


Blood Gas        Blood venous


Specimen Source


Arterial Blood   2/18/2019
11:02  
                
               



Date Drawn
               :40 PM


Arterial Blood   VENOUS LINE  
   
                
               



Gas


Puncture
Site


[facility] Test       N/A


Venous Blood pH         7.203  L


Venous Blood            25.2  L
  
                
               



pCO2


(Temp
Corrected


)


Venous Blood            44.7  H
  
                
               



pO2


(Temp
Corrected


)


Venous Blood              9.7  L


HCO3


Venous Blood             78.7  H


Oxygen


Saturation


Venous Blood            -16.5  L


Base Excess


Venous Blood              14.3


Total


Hemoglobin


Venous Blood              78.4


Oxyhemoglobin


Venous Blood               0.2


Methemoglobin


Carboxyhemoglob            0.2


in


Blood Gas                 37.0


Temperature


Blood Gas        ROOM AIR


Modality


FiO2                      21.0


Blood Gas        MM


Notified Whom


Blood Gas        2/18/2019
11:08  
                
               



Notified Time
            :21 PM


Bedside Glucose                             201                            138


Urine Color                                        STRAW


Urine Clarity                                      CLEAR


Urine pH                                                    5.0


Urine Specific                                            1.017


Gravity


Urine Ketones                                               2+  H


Urine Nitrite                                      NEGATIVE


Urine Bilirubin                                    NEGATIVE


Urine                                              NEGATIVE


Urobilinogen


Urine Leukocyte                                    NEGATIVE


Esterase


Urine                                                         2


Microscopic RBC


Urine                                                         1


Microscopic WBC


Urine Mucus                                        FEW  A


Urine                                                       1+  H


Hemoglobin


Urine Glucose                                               3+  H


Urine Total                                        NEGATIVE


Protein


Test
              2/19/19
01:56    2/19/19
03:01   2/19/19
03:19  2/19/19
03:45


Bedside Glucose            162              145


Blood Gas                                          Blood venous


Specimen Source


Arterial Blood   
                
                2/19/2019
3:09  



Date Drawn
                                                :19 AM


Arterial Blood   
                
                VENOUS LINE  
  



Gas


Puncture
Site


[facility] Test                                         N/A


Venous Blood pH                                          7.289  L


Venous Blood     
                
                      33.5  L
  



pCO2


(Temp
Corrected


)


Venous Blood     
                
                      46.5  H
  



pO2


(Temp
Corrected


)


Venous Blood                                              15.7  L


HCO3


Venous Blood                                              86.9  H


Oxygen


Saturation


Venous Blood                                              -9.8  L


Base Excess


Venous Blood                                               14.0


Total


Hemoglobin


Venous Blood                                               86.4


Oxyhemoglobin


Venous Blood                                                0.3


Methemoglobin


Carboxyhemoglob                                             0.3


in


Blood Gas                                                  37.0


Temperature


Blood Gas                                          ROOM AIR


Modality


FiO2                                                       21.0


Blood Gas                                          MM


Notified Whom


Blood Gas        
                
                2/19/2019
3:16  



Notified Time
                                             :02 AM


Sodium Level                                                               138


Potassium Level                                                           3.2  L


Chloride Level                                                            113  H


Carbon Dioxide                                                             16  L


Level


Anion Gap                                                                   9  #


Blood Urea                                                                  11


Nitrogen


Creatinine                                                                0.62


Est Glomerular   
                
                
               > 60  



Filtrat


Rate
mL/min


Glucose Level                                                              86  #


Calcium Level                                                             7.9  L


Phosphorus                                                                2.1  L


Level


Magnesium Level                                                            1.9


Test
              2/19/19
03:55    2/19/19
04:55   2/19/19
05:54  2/19/19
06:42


Bedside Glucose            107              116              83             84


Test
              2/19/19
07:49    2/19/19
07:50   2/19/19
08:46  2/19/19
10:03


Sodium Level               137


Potassium Level            3.5


Chloride Level             107


Carbon Dioxide             18  L


Level


Anion Gap                   12


Blood Urea                  10


Nitrogen


Creatinine               0.59  L


Est Glomerular   > 60  
          
                
               



Filtrat


Rate
mL/min


Glucose Level              105


Calcium Level             8.0  L


Bedside Glucose                             137             138            124








Subjective


24 Hr Interval Summary


Free Text/Dictation


Patient awake, alert, no complaints.


Started on diet, transitioned to subQ insulin today.





Exam/Review of Systems


Exam


Vitals





Vital Signs


  Date      Temp  Pulse  Resp  B/P (MAP)   Pulse Ox  O2          O2 Flow    FiO2


Time                                                 Delivery    Rate


   2/19/19           76    22      105/71       100  Room Air


     13:00                           (82)


   2/19/19  98.6


     12:00








Intake and Output





2/18/19 2/18/19 2/19/19





1515:00


23:00


07:00





IntakeIntake Total


2258.35 ml





OutputOutput Total


900 ml





BalanceBalance


1358.35 ml











Exam


General: Patient is a pleasant man, awake and alert in no distress. 


HEENT: Atraumatic, normocephalic. The pupils are equal, round and reactive. 


Extraocular motor are intact


Neck: Supple with full range of motion. No rigidity or meningismus


Chest: Nontender


Lungs: Clear to auscultation bilaterally no crackles rales or wheezing


Heart: Normal S1-S2, Regular rhythm and rate. no murmurs


Abdomen: Soft , nontender,  nondistended , bowel sounds are present. No guarding


no rebound tenderness. 


Extremities: Normal to inspection, no edema no cyanosis





Results


Results 24hrs





Laboratory Tests


Test
              2/18/19
19:05    2/18/19
19:06   2/18/19
20:32  2/18/19
20:55


White Blood             11.0  #H


Count


Red Blood Count           5.34


Hemoglobin                15.9


Hematocrit                46.2


Mean                      86.5


Corpuscular


Volume


Mean                      29.8


Corpuscular


Hemoglobin


Mean                     34.4  
  
                
               



Corpuscular


Hemoglobin
Conc


ent


Red Cell                  12.9


Distribution


Width


Platelet Count             219


Mean Platelet            10.7  H


Volume


Immature                0.500  H


Granulocytes %


Neutrophils %             68.9


Lymphocytes %             23.0


Monocytes %                7.0


Eosinophils %              0.2


Basophils %                0.4


Nucleated Red              0.0


Blood Cells %


Immature                0.050  H


Granulocytes #


Neutrophils #             7.6  H


Lymphocytes #              2.5


Monocytes #                0.8


Eosinophils #              0.0


Basophils #                0.0


Nucleated Red              0.0


Blood Cells #


Sodium Level               135                                             135


Potassium Level            4.4                                             3.8


Chloride Level             100                                             109


Carbon Dioxide             7  *L                                           8  *L


Level


Anion Gap                  28  H                                          18  #H


Blood Urea                  15                                              14


Nitrogen


Creatinine                0.94                                            0.67


Est Glomerular   > 60  
          
                
               > 60  



Filtrat


Rate
mL/min


Glucose Level             335  H                                          280  H


Bedside Glucose           361  H                           276  H


Hemoglobin A1c


Calcium Level              9.2                                            8.1  L


Phosphorus                 4.0                                             3.3


Level


Magnesium Level            2.0                                             1.7


Troponin I       < 0.012


Blood Gas                         Blood venous


Specimen Source


Arterial Blood   
                2/18/2019
7:10:  
               



Date Drawn
                                 14 PM


Arterial Blood   
                VENOUS LINE  
   
               



Gas


Puncture
Site


[facility] Test                        N/A


Venous Blood pH                         7.151  *L


Venous Blood     
                       28.2  L
  
               



pCO2


(Temp
Corrected


)


Venous Blood     
                        28.6  
  
               



pO2


(Temp
Corrected


)


Venous Blood                               9.6  L


HCO3


Venous Blood                              54.5  L


Oxygen


Saturation


Venous Blood                             -17.7  L


Base Excess


Venous Blood                               16.1


Total


Hemoglobin


Venous Blood                               54.1


Oxyhemoglobin


Venous Blood                                0.3


Methemoglobin


Carboxyhemoglob                             0.5


in


Blood Gas                                  37.0


Temperature


Blood Gas                         ROOM AIR


Modality


FiO2                                       21.0


Blood Gas        
                J.               
               



Critical Value                    ZEKE BURNHAM


Read
Back


Blood Gas                         


Notified Whom


Blood Gas        
                2/18/2019
7:17:  
               



Notified Time
                              29 PM


Test
              2/18/19
21:19    2/18/19
21:46   2/18/19
22:50  2/18/19
23:05


Blood Gas        Blood venous


Specimen Source


Arterial Blood   2/18/2019
9:25:  
                
               



Date Drawn
                42 PM


Arterial Blood   VENOUS LINE  
   
                
               



Gas


Puncture
Site


[facility] Test       N/A


Venous Blood pH        7.149  *L


Venous Blood            23.4  L
  
                
               



pCO2


(Temp
Corrected


)


Venous Blood            43.3  H
  
                
               



pO2


(Temp
Corrected


)


Venous Blood              7.9  L


HCO3


Venous Blood             75.9  H


Oxygen


Saturation


Venous Blood            -19.1  L


Base Excess


Venous Blood              13.9


Total


Hemoglobin


Venous Blood              75.6


Oxyhemoglobin


Venous Blood               0.3


Methemoglobin


Carboxyhemoglob            0.1


in


Blood Gas                 37.0


Temperature


Blood Gas        ROOM AIR


Modality


FiO2                      21.0


Blood Gas        ALICIA TREVIZO RN
  
                
               



Critical Value


Read
Back


Blood Gas        


Notified Whom


Blood Gas        2/18/2019
9:31:  
                
               



Notified Time
             32 PM


Bedside Glucose                            258  H           209


Sodium Level                                                               137


Potassium Level                                                            3.6


Chloride Level                                                             108


Carbon Dioxide                                                             9  *L


Level


Anion Gap                                                                  20  H


Blood Urea                                                                  12


Nitrogen


Creatinine                                                                0.63


Est Glomerular   
                
                
               > 60  



Filtrat


Rate
mL/min


Glucose Level                                                              217


Calcium Level                                                             7.9  L


Phosphorus                                                               2.3  #L


Level


Magnesium Level                                                            1.8


Test
              2/18/19
23:19    2/18/19
23:49   2/19/19
00:30  2/19/19
01:04


Blood Gas        Blood venous


Specimen Source


Arterial Blood   2/18/2019
11:02  
                
               



Date Drawn
               :40 PM


Arterial Blood   VENOUS LINE  
   
                
               



Gas


Puncture
Site


[facility] Test       N/A


Venous Blood pH         7.203  L


Venous Blood            25.2  L
  
                
               



pCO2


(Temp
Corrected


)


Venous Blood            44.7  H
  
                
               



pO2


(Temp
Corrected


)


Venous Blood              9.7  L


HCO3


Venous Blood             78.7  H


Oxygen


Saturation


Venous Blood            -16.5  L


Base Excess


Venous Blood              14.3


Total


Hemoglobin


Venous Blood              78.4


Oxyhemoglobin


Venous Blood               0.2


Methemoglobin


Carboxyhemoglob            0.2


in


Blood Gas                 37.0


Temperature


Blood Gas        ROOM AIR


Modality


FiO2                      21.0


Blood Gas        MM


Notified Whom


Blood Gas        2/18/2019
11:08  
                
               



Notified Time
            :21 PM


Bedside Glucose                             201                            138


Urine Color                                        STRAW


Urine Clarity                                      CLEAR


Urine pH                                                    5.0


Urine Specific                                            1.017


Gravity


Urine Ketones                                               2+  H


Urine Nitrite                                      NEGATIVE


Urine Bilirubin                                    NEGATIVE


Urine                                              NEGATIVE


Urobilinogen


Urine Leukocyte                                    NEGATIVE


Esterase


Urine                                                         2


Microscopic RBC


Urine                                                         1


Microscopic WBC


Urine Mucus                                        FEW  A


Urine                                                       1+  H


Hemoglobin


Urine Glucose                                               3+  H


Urine Total                                        NEGATIVE


Protein


Test
              2/19/19
01:56    2/19/19
03:01   2/19/19
03:19  2/19/19
03:45


Bedside Glucose            162              145


Blood Gas                                          Blood venous


Specimen Source


Arterial Blood   
                
                2/19/2019
3:09  



Date Drawn
                                                :19 AM


Arterial Blood   
                
                VENOUS LINE  
  



Gas


Puncture
Site


[facility]ador Test                                         N/A


Venous Blood pH                                          7.289  L


Venous Blood     
                
                      33.5  L
  



pCO2


(Temp
Corrected


)


Venous Blood     
                
                      46.5  H
  



pO2


(Temp
Corrected


)


Venous Blood                                              15.7  L


HCO3


Venous Blood                                              86.9  H


Oxygen


Saturation


Venous Blood                                              -9.8  L


Base Excess


Venous Blood                                               14.0


Total


Hemoglobin


Venous Blood                                               86.4


Oxyhemoglobin


Venous Blood                                                0.3


Methemoglobin


Carboxyhemoglob                                             0.3


in


Blood Gas                                                  37.0


Temperature


Blood Gas                                          ROOM AIR


Modality


FiO2                                                       21.0


Blood Gas                                          MM


Notified Whom


Blood Gas        
                
                2/19/2019
3:16  



Notified Time
                                             :02 AM


Sodium Level                                                               138


Potassium Level                                                           3.2  L


Chloride Level                                                            113  H


Carbon Dioxide                                                             16  L


Level


Anion Gap                                                                   9  #


Blood Urea                                                                  11


Nitrogen


Creatinine                                                                0.62


Est Glomerular   
                
                
               > 60  



Filtrat


Rate
mL/min


Glucose Level                                                              86  #


Calcium Level                                                             7.9  L


Phosphorus                                                                2.1  L


Level


Magnesium Level                                                            1.9


Test
              2/19/19
03:55    2/19/19
04:55   2/19/19
05:54  2/19/19
06:42


Bedside Glucose            107              116              83             84


Test
              2/19/19
07:49    2/19/19
07:50   2/19/19
08:46  2/19/19
10:03


Sodium Level               137


Potassium Level            3.5


Chloride Level             107


Carbon Dioxide             18  L


Level


Anion Gap                   12


Blood Urea                  10


Nitrogen


Creatinine               0.59  L


Est Glomerular   > 60  
          
                
               



Filtrat


Rate
mL/min


Glucose Level              105


Calcium Level             8.0  L


Bedside Glucose                             137             138            124








Medications


Medication





Current Medications


Miscellaneous Information (* Miscellaneous Pharmacy Order) HYPOGLYCEMIA 


TREATMENT HYPOGLYCEM PROTOCOL PRN XX .HYPOGLYCEMIA PROTOCOL;  Start 2/18/19 at 


19:30


Dextrose (D50w Syringe) 50 ml Q15M  PRN IV .DECREASED GLUCOSE;  Start 2/18/19 at


19:30


Dextrose (D50w Syringe) 25 ml Q15M  PRN IV .DECREASED GLUCOSE;  Start 2/18/19 at


19:30


Ondansetron HCl (Zofran Inj) 4 mg Q6H  PRN IV NAUSEA AND/OR VOMITING;  Start 


2/18/19 at 21:00


Acetaminophen (Tylenol Tab) 650 mg Q6H  PRN PO PAIN LEVEL 1-3 OR FEVER;  Start 


2/18/19 at 21:00


Enoxaparin Sodium (Lovenox) 40 mg DAILY SC  Last administered on 2/19/19at 


09:04; Admin Dose 40 MG;  Start 2/19/19 at 09:00


Pantoprazole (Protonix Iv) 40 mg DAILY@06 IV  Last administered on 2/19/19at 


05:25; Admin Dose 40 MG;  Start 2/19/19 at 06:00


Insulin Glargine (Lantus) 25 units DAILY SC ;  Start 2/19/19 at 12:00


Insulin Aspart (Novolog Insulin Pen) 7 unit WITH  MEALS SC ;  Start 2/19/19 at 


11:30


Insulin Aspart (Novolog Insulin Pen) NOVOLOG *MODERATE* ALGORITHM WITH MEALS  


BEDTIME SC ;  Start 2/19/19 at 11:30











PRANAV AUGUSTIN MD              Feb 19, 2019 13:54

## 2019-02-20 VITALS — DIASTOLIC BLOOD PRESSURE: 74 MMHG | RESPIRATION RATE: 18 BRPM | SYSTOLIC BLOOD PRESSURE: 125 MMHG | HEART RATE: 105 BPM

## 2019-02-20 VITALS — HEART RATE: 76 BPM | RESPIRATION RATE: 18 BRPM | SYSTOLIC BLOOD PRESSURE: 111 MMHG | DIASTOLIC BLOOD PRESSURE: 68 MMHG

## 2019-02-20 VITALS — DIASTOLIC BLOOD PRESSURE: 69 MMHG | RESPIRATION RATE: 18 BRPM | HEART RATE: 83 BPM | SYSTOLIC BLOOD PRESSURE: 112 MMHG

## 2019-02-20 LAB
ADD MAN DIFF?: NO
ANION GAP: 14 (ref 5–13)
BASOPHIL #: 0 10^3/UL (ref 0–0.1)
BASOPHILS %: 0.2 % (ref 0–2)
BLOOD UREA NITROGEN: 10 MG/DL (ref 7–20)
CALCIUM: 8.8 MG/DL (ref 8.4–10.2)
CARBON DIOXIDE: 22 MMOL/L (ref 21–31)
CHLORIDE: 99 MMOL/L (ref 97–110)
CREATININE: 0.57 MG/DL (ref 0.61–1.24)
EOSINOPHILS #: 0 10^3/UL (ref 0–0.5)
EOSINOPHILS %: 0.5 % (ref 0–7)
GLUCOSE: 298 MG/DL (ref 70–220)
HEMATOCRIT: 38.6 % (ref 42–52)
HEMOGLOBIN: 13.8 G/DL (ref 14–18)
IMMATURE GRANS #M: 0.02 10^3/UL (ref 0–0.03)
IMMATURE GRANS % (M): 0.3 % (ref 0–0.43)
LYMPHOCYTES #: 2 10^3/UL (ref 0.8–2.9)
LYMPHOCYTES %: 33.7 % (ref 15–51)
MAGNESIUM: 1.9 MG/DL (ref 1.7–2.5)
MEAN CORPUSCULAR HEMOGLOBIN: 29.7 PG (ref 29–33)
MEAN CORPUSCULAR HGB CONC: 35.8 G/DL (ref 32–37)
MEAN CORPUSCULAR VOLUME: 83.2 FL (ref 82–101)
MEAN PLATELET VOLUME: 10.2 FL (ref 7.4–10.4)
MONOCYTE #: 0.5 10^3/UL (ref 0.3–0.9)
MONOCYTES %: 8 % (ref 0–11)
NEUTROPHIL #: 3.4 10^3/UL (ref 1.6–7.5)
NEUTROPHILS %: 57.3 % (ref 39–77)
NUCLEATED RED BLOOD CELLS #: 0 10^3/UL (ref 0–0)
NUCLEATED RED BLOOD CELLS%: 0 /100WBC (ref 0–0)
PHOSPHORUS: 3.2 MG/DL (ref 2.5–4.9)
PLATELET COUNT: 169 10^3/UL (ref 140–415)
POTASSIUM: 3.8 MMOL/L (ref 3.5–5.1)
RED BLOOD COUNT: 4.64 10^6/UL (ref 4.7–6.1)
RED CELL DISTRIBUTION WIDTH: 13.1 % (ref 11.5–14.5)
SODIUM: 135 MMOL/L (ref 135–144)
WHITE BLOOD COUNT: 5.9 10^3/UL (ref 4.8–10.8)

## 2019-02-20 RX ADMIN — POTASSIUM CHLORIDE 1 MLS/HR: 200 INJECTION, SOLUTION INTRAVENOUS at 03:40

## 2019-02-20 RX ADMIN — PANTOPRAZOLE SODIUM SCH MG: 40 INJECTION, POWDER, FOR SOLUTION INTRAVENOUS at 05:54

## 2019-02-20 RX ADMIN — POTASSIUM CHLORIDE SCH MLS/HR: 200 INJECTION, SOLUTION INTRAVENOUS at 05:55

## 2019-02-20 RX ADMIN — INSULIN ASPART 1 UNIT: 100 INJECTION, SOLUTION INTRAVENOUS; SUBCUTANEOUS at 12:23

## 2019-02-20 RX ADMIN — INSULIN GLARGINE 1 UNITS: 100 INJECTION, SOLUTION SUBCUTANEOUS at 08:20

## 2019-02-20 RX ADMIN — POTASSIUM CHLORIDE SCH MLS/HR: 200 INJECTION, SOLUTION INTRAVENOUS at 07:53

## 2019-02-20 RX ADMIN — INSULIN ASPART 1 UNIT: 100 INJECTION, SOLUTION INTRAVENOUS; SUBCUTANEOUS at 12:25

## 2019-02-20 RX ADMIN — PANTOPRAZOLE SODIUM 1 MG: 40 INJECTION, POWDER, FOR SOLUTION INTRAVENOUS at 05:54

## 2019-02-20 RX ADMIN — POTASSIUM CHLORIDE SCH MLS/HR: 200 INJECTION, SOLUTION INTRAVENOUS at 03:40

## 2019-02-20 RX ADMIN — POTASSIUM CHLORIDE SCH MLS/HR: 200 INJECTION, SOLUTION INTRAVENOUS at 01:32

## 2019-02-20 RX ADMIN — INSULIN GLARGINE SCH UNITS: 100 INJECTION, SOLUTION SUBCUTANEOUS at 08:20

## 2019-02-20 RX ADMIN — POTASSIUM CHLORIDE 1 MLS/HR: 200 INJECTION, SOLUTION INTRAVENOUS at 07:53

## 2019-02-20 RX ADMIN — INSULIN ASPART 1 UNIT: 100 INJECTION, SOLUTION INTRAVENOUS; SUBCUTANEOUS at 08:02

## 2019-02-20 RX ADMIN — POTASSIUM CHLORIDE 1 MLS/HR: 200 INJECTION, SOLUTION INTRAVENOUS at 05:55

## 2019-02-20 RX ADMIN — POTASSIUM CHLORIDE 1 MLS/HR: 200 INJECTION, SOLUTION INTRAVENOUS at 01:32

## 2019-02-20 RX ADMIN — ENOXAPARIN SODIUM SCH MG: 100 INJECTION SUBCUTANEOUS at 08:00

## 2019-02-20 RX ADMIN — ENOXAPARIN SODIUM 1 MG: 100 INJECTION SUBCUTANEOUS at 08:00

## 2019-02-20 RX ADMIN — INSULIN ASPART 1 UNIT: 100 INJECTION, SOLUTION INTRAVENOUS; SUBCUTANEOUS at 08:01

## 2019-02-20 NOTE — PDOCDIS
Discharge Instructions


DIAGNOSIS


Discharge Diagnosis


Diabetic ketoacidosis





CONDITION


                 [id number]
Patient Condition:  [id number]
Good








HOME CARE INSTRUCTIONS:


          [id number]
Special Diet:  [id number]
Consistent carbohydrate








FOLLOW UP/APPOINTMENTS


Follow-up Plan


1. Take metformin daily.


2. For your insulin, take Tresiba 30 units daily until you run out


3. Then switch to insulin 70/30 (R and N) 30 units twice daily. 


4. Make an appointment with your primary care doctor in 1-2 weeks.











PRANAV AUGUSTIN MD              Feb 20, 2019 13:31

## 2019-02-20 NOTE — DS
Date/Time of Note


Date/Time of Note


DATE: 2/20/19 


TIME: 17:53





Discharge Summary


Admission/Discharge Info


Admit Date/Time


Feb 18, 2019 at 20:22


Discharge Date/Time


Feb 20, 2019 at 17:20


Discharge Diagnosis


Diabetic ketoacidosis


Patient Condition:  Good


Consults


None


Procedures


None


Hx of Present Illness


43 yo M with PMH diabetes presented to ED with dizziness for the past 3 days.  


Patient states he has not been taking his metformin for the past 2 weeks since 


he ran out of his medications.  He has been experiencing weakness, dizziness, 


and acid reflux for the past 3 days.  He has been drinking fluids but has not 


been taking much PO intake given reflux symptoms which worsen with food.  


Patient has associated nausea, but denies any vomiting, hematemesis, chest pain,


shortness of breath, palpitations, abdominal pain, urinary issues, diarrhea, 


melena, or bright red blood per rectum.  In ED, patient found in DKA and started


on protocol.


Hospital Course





Gap closed quickly in the ICU, switched to subQ insulin. No infectious causes 


identifed; cause of DKA was probably medication noncompliance. He did admit 


previously being on insulin but being unable to afford it. We found some free 


samples of insulin degludec pens to give him. After that will switch to insulin 


70/30.


Home Meds


Reported Medications


Omega-3 Fatty Acids/Fish Oil (Fish Oil 1,000 mg Capsule) 1 Each Capsule, 1 EACH 


PO DAILY, CAP


   2/18/19


Discontinued Scripts


Blood Glucose Strips-Dispmeter ([employer] Blood Glucose System) 1 Each Kit, 1 


EACH MC DAILY, #1


   Prov:KARINA IBRAHIM MD         10/8/17


Blood-Glucose Meter (BLOOD GLUCOSE MONITORING) 1 Each Each, 1 EACH MC DAILY, #1


   Prov:KARINA IBRAHIM MD         10/8/17


Atorvastatin* (Atorvastatin*) 40 Mg Tablet, 40 MG PO HS for 60 Days, #60 TAB 5 


Refills


   Prov:KARINA IBRAHIM MD         10/8/17


Aspirin (Aspirin) 81 Mg Chew, 81 MG PO DAILY for 30 Days, #30 TAB 5 Refills


   Prov:KARINA IBRAHIM MD         10/8/17


Linagliptin (TRADJENTA) 5 Mg Tablet, 5 MG PO DAILY for 30 Days, #30 TAB 5 


Refills


   Prov:KARINA IBRAHIM MD         10/8/17


Metformin Hcl (Glucophage) 500 Mg Tablet, 1000 MG PO PC BREAKFAST DINNER for 30 


Days, #60 TAB 4 Refills


   Prov:KARINA IBRAHIM MD         10/8/17


Insulin Glargine* (Lantus*) 100 Unit/Ml Soln, 25 UNIT SC DAILY@08 for 30 Days, 


#30 DAYSX 3 LO 5 Refills


   Prov:KARINA IBRAHIM MD         10/8/17


Insulin Aspart* (Novolog Insulin Pen*) 100 Unit/Ml Soln, 12 UNIT SC WITH MEALS 


for 30 Days, #30 DAYSX 3 LO 5 Refills


   Prov:KARINA IBRAHIM MD         10/8/17


Follow-up Plan


1. Take metformin daily.


2. For your insulin, take Tresiba 30 units daily until you run out


3. Then switch to insulin 70/30 (R and N) 30 units twice daily. 


4. Make an appointment with your primary care doctor in 1-2 weeks.


Primary Care Provider


Not On Staff Doctor


Time spent on discharge:   > 30 minutes


Pending Labs





Laboratory Tests


Test
              2/19/19
17:55   2/19/19
19:30   2/19/19
21:18   2/19/19
22:54


Sodium Level
                134             132  
                          135


                 mmol/L
(135-144  mmol/L
(135-14                  mmol/L
(135-14


                               )              4)                              4)


Potassium                    3.5             3.2  
                          2.8


Level
           mmol/L
(3.5-5.1  mmol/L
(3.5-5.                  mmol/L
(3.5-5.


                               )              1)                              1)


Chloride Level
              106             103  
                          100


                 mmol/L
()  mmol/L
(                  mmol/L
(


                                               )                               )


Carbon Dioxide                17              20  
                           22


Level
            mmol/L
(21-31)  mmol/L
(21-31)                  mmol/L
(21-31)


Anion Gap             11 (5-13)        9 (5-13)                       13 (5-13)


Blood Urea       15 mg/dl
(7-20)              16  
                           13


Nitrogen
                           mg/dl
(7-20)                    mg/dl
(7-20)


Creatinine
                 0.80            0.81  
                         0.74


                 mg/dl
(0.61-1.2  mg/dl
(0.61-1.                  mg/dl
(0.61-1.


                              4)             24)                             24)


Est Glomerular   > 60             > 60            
               > 60


Filtrat          mL/min
(>60)     mL/min
(>60)                    mL/min
(>60)


Rate
mL/min


Glucose Level
               369             259  
                          167


                  mg/dl
()  mg/dl
()                  mg/dl
()


Calcium Level
               8.9             8.8  
                          8.4


                 mg/dl
(8.4-10.2  mg/dl
(8.4-10.                  mg/dl
(8.4-10.


                               )              2)                              2)


Phosphorus       
                           2.5  
                          3.3


Level
                            mg/dl
(2.5-4.9                  mg/dl
(2.5-4.9


                                               )                               )


Magnesium        
                           1.8  
                          1.9


Level
                            mg/dl
(1.7-2.5                  mg/dl
(1.7-2.5


                                               )                               )


Bedside          
                
                          190  



Glucose
                                          mg/dL
()


Test
              2/20/19
02:33   2/20/19
07:47   2/20/19
09:09   2/20/19
09:19


Bedside                      186             186  
               



Glucose
          mg/dL
()  mg/dL
()


Lab Scanned      
                
               REFERENCE       



Report
                                           LAB
[id number]


White Blood      
                
               
                          5.9


Count
                                                            10^3/ul
(4.8-1


                                                                            0.8)


Red Blood        
                
               
                         4.64


Count
                                                            10^6/ul
(4.70-


                                                                           6.10)


Hemoglobin
      
                
               
                         13.8


                                                                  g/dl
(14.0-18.


                                                                              0)


Hematocrit
      
                
               
                         38.6


                                                                   %
(42.0-52.0)


Mean             
                
               
                         83.2


Corpuscular                                                       fl
(82.0-101.0


Volume
                                                                        )


Mean             
                
               
                         29.7


Corpuscular                                                       pg
(29.0-33.0)


Hemoglobin



Mean             
                
               
                         35.8


Corpuscular                                                       g/dl
(32.0-37.


Hemoglobin
Conc                                                               0)


ent


Red Cell         
                
               
                         13.1


Distribution                                                       %
(11.5-14.5)


Width



Platelet Count
  
                
               
                          169


                                                                  10^3/UL
(140-4


                                                                             15)


Mean Platelet    
                
               
                         10.2


Volume
                                                            fl
(7.4-10.4)


Immature         
                
               
                        0.300


Granulocytes %
                                                   %
(0.001-0.429


                                                                               )


Neutrophils %
   
                
               
                         57.3


                                                                   %
(39.0-77.0)


Lymphocytes %
   
                
               
                         33.7


                                                                   %
(15.0-51.0)


Monocytes %
     
                
               
                          8.0


                                                                    %
(0.0-11.0)


Eosinophils %
   
                
               
                          0.5


                                                                     %
(0.0-7.0)


Basophils %
     
                
               
                          0.2


                                                                     %
(0.0-2.0)


Nucleated Red    
                
               
                          0.0


Blood Cells %
                                                    /100WBC
(0.0-0


                                                                             .0)


Immature         
                
               
                        0.020


Granulocytes #
                                                   10^3/ul
(0.0-0


                                                                           .031)


Neutrophils #
   
                
               
                          3.4


                                                                  10^3/ul
(1.6-7


                                                                             .5)


Lymphocytes #
   
                
               
                          2.0


                                                                  10^3/ul
(0.8-2


                                                                             .9)


Monocytes #
     
                
               
                          0.5


                                                                  10^3/ul
(0.3-0


                                                                             .9)


Eosinophils #
   
                
               
                          0.0


                                                                  10^3/ul
(0.0-0


                                                                             .5)


Basophils #
     
                
               
                          0.0


                                                                  10^3/ul
(0.0-0


                                                                             .1)


Nucleated Red    
                
               
                          0.0


Blood Cells #
                                                    10^3/ul
(0.0-0


                                                                             .0)


Sodium Level
    
                
               
                          135


                                                                  mmol/L
(135-14


                                                                              4)


Potassium        
                
               
                          3.8


Level
                                                            mmol/L
(3.5-5.


                                                                              1)


Chloride Level
  
                
               
                           99


                                                                  mmol/L
(


                                                                               )


Carbon Dioxide   
                
               
                           22


Level
                                                            mmol/L
(21-31)


Anion Gap                                                             14 (5-13)


Blood Urea       
                
               
                           10


Nitrogen
                                                           mg/dl
(7-20)


Creatinine
      
                
               
                         0.57


                                                                  mg/dl
(0.61-1.


                                                                             24)


Est Glomerular   
                
               
               > 60


Filtrat                                                           mL/min
(>60)


Rate
mL/min


Glucose Level
   
                
               
                          298


                                                                  mg/dl
()


Calcium Level
   
                
               
                          8.8


                                                                  mg/dl
(8.4-10.


                                                                              2)


Phosphorus       
                
               
                          3.2


Level
                                                            mg/dl
(2.5-4.9


                                                                               )


Magnesium        
                
               
                          1.9


Level
                                                            mg/dl
(1.7-2.5


                                                                               )


Test
              2/20/19
12:18  
               
               



Bedside                      239  
               
               



Glucose
          mg/dL
()














PRANAV AUGUSTIN MD              Feb 20, 2019 17:58